# Patient Record
Sex: MALE | NOT HISPANIC OR LATINO | URBAN - METROPOLITAN AREA
[De-identification: names, ages, dates, MRNs, and addresses within clinical notes are randomized per-mention and may not be internally consistent; named-entity substitution may affect disease eponyms.]

---

## 2018-12-26 ENCOUNTER — APPOINTMENT (RX ONLY)
Dept: URBAN - METROPOLITAN AREA CLINIC 368 | Facility: CLINIC | Age: 18
Setting detail: DERMATOLOGY
End: 2018-12-26

## 2018-12-26 DIAGNOSIS — L65.9 NONSCARRING HAIR LOSS, UNSPECIFIED: ICD-10-CM | Status: INADEQUATELY CONTROLLED

## 2018-12-26 PROCEDURE — 99202 OFFICE O/P NEW SF 15 MIN: CPT

## 2018-12-26 PROCEDURE — ? COUNSELING

## 2018-12-26 PROCEDURE — ? PRESCRIPTION

## 2018-12-26 PROCEDURE — ? ORDER TESTS

## 2018-12-26 PROCEDURE — ? TREATMENT REGIMEN

## 2018-12-26 RX ORDER — MINOXIDIL 2 %
SOLUTION, NON-ORAL TOPICAL
Qty: 1 | Refills: 4 | Status: CANCELLED
Stop reason: ALTCHOICE

## 2018-12-26 NOTE — PROCEDURE: TREATMENT REGIMEN
Detail Level: Zone
Initiate Treatment: Orally: take Biotin supplement (2000mg) daily with food and water\\nNightly: apply a thin layer of Minoxidil foam to the affected area of the scalp and leave on overnight.\\n\\nWill most likely start Spironolactone when we get the blood work results back.